# Patient Record
Sex: FEMALE | Race: OTHER | NOT HISPANIC OR LATINO | URBAN - METROPOLITAN AREA
[De-identification: names, ages, dates, MRNs, and addresses within clinical notes are randomized per-mention and may not be internally consistent; named-entity substitution may affect disease eponyms.]

---

## 2020-03-04 ENCOUNTER — EMERGENCY (EMERGENCY)
Facility: HOSPITAL | Age: 75
LOS: 1 days | Discharge: HOME | End: 2020-03-04
Admitting: EMERGENCY MEDICINE
Payer: MEDICARE

## 2020-03-04 VITALS
DIASTOLIC BLOOD PRESSURE: 100 MMHG | WEIGHT: 145.06 LBS | OXYGEN SATURATION: 96 % | HEIGHT: 66 IN | RESPIRATION RATE: 18 BRPM | HEART RATE: 53 BPM | SYSTOLIC BLOOD PRESSURE: 196 MMHG | TEMPERATURE: 98 F

## 2020-03-04 VITALS
OXYGEN SATURATION: 98 % | RESPIRATION RATE: 16 BRPM | HEART RATE: 60 BPM | SYSTOLIC BLOOD PRESSURE: 160 MMHG | DIASTOLIC BLOOD PRESSURE: 65 MMHG | TEMPERATURE: 98 F

## 2020-03-04 LAB
ALBUMIN SERPL ELPH-MCNC: 3.6 G/DL — SIGNIFICANT CHANGE UP (ref 3.4–5)
ALP SERPL-CCNC: 84 U/L — SIGNIFICANT CHANGE UP (ref 40–120)
ALT FLD-CCNC: 34 U/L — SIGNIFICANT CHANGE UP (ref 12–42)
ANION GAP SERPL CALC-SCNC: 7 MMOL/L — LOW (ref 9–16)
ANION GAP SERPL CALC-SCNC: 8 MMOL/L — LOW (ref 9–16)
AST SERPL-CCNC: 36 U/L — SIGNIFICANT CHANGE UP (ref 15–37)
BASOPHILS # BLD AUTO: 0.01 K/UL — SIGNIFICANT CHANGE UP (ref 0–0.2)
BASOPHILS NFR BLD AUTO: 0.2 % — SIGNIFICANT CHANGE UP (ref 0–2)
BILIRUB SERPL-MCNC: 0.6 MG/DL — SIGNIFICANT CHANGE UP (ref 0.2–1.2)
BUN SERPL-MCNC: 14 MG/DL — SIGNIFICANT CHANGE UP (ref 7–23)
BUN SERPL-MCNC: 18 MG/DL — SIGNIFICANT CHANGE UP (ref 7–23)
CALCIUM SERPL-MCNC: 9.6 MG/DL — SIGNIFICANT CHANGE UP (ref 8.5–10.5)
CALCIUM SERPL-MCNC: 9.6 MG/DL — SIGNIFICANT CHANGE UP (ref 8.5–10.5)
CHLORIDE SERPL-SCNC: 101 MMOL/L — SIGNIFICANT CHANGE UP (ref 96–108)
CHLORIDE SERPL-SCNC: 102 MMOL/L — SIGNIFICANT CHANGE UP (ref 96–108)
CO2 SERPL-SCNC: 30 MMOL/L — SIGNIFICANT CHANGE UP (ref 22–31)
CO2 SERPL-SCNC: 31 MMOL/L — SIGNIFICANT CHANGE UP (ref 22–31)
CREAT SERPL-MCNC: 0.67 MG/DL — SIGNIFICANT CHANGE UP (ref 0.5–1.3)
CREAT SERPL-MCNC: 0.67 MG/DL — SIGNIFICANT CHANGE UP (ref 0.5–1.3)
EOSINOPHIL # BLD AUTO: 0.02 K/UL — SIGNIFICANT CHANGE UP (ref 0–0.5)
EOSINOPHIL NFR BLD AUTO: 0.3 % — SIGNIFICANT CHANGE UP (ref 0–6)
GLUCOSE SERPL-MCNC: 112 MG/DL — HIGH (ref 70–99)
GLUCOSE SERPL-MCNC: 99 MG/DL — SIGNIFICANT CHANGE UP (ref 70–99)
HCT VFR BLD CALC: 41.6 % — SIGNIFICANT CHANGE UP (ref 34.5–45)
HGB BLD-MCNC: 13.8 G/DL — SIGNIFICANT CHANGE UP (ref 11.5–15.5)
IMM GRANULOCYTES NFR BLD AUTO: 0.2 % — SIGNIFICANT CHANGE UP (ref 0–1.5)
LYMPHOCYTES # BLD AUTO: 1.46 K/UL — SIGNIFICANT CHANGE UP (ref 1–3.3)
LYMPHOCYTES # BLD AUTO: 25.5 % — SIGNIFICANT CHANGE UP (ref 13–44)
MCHC RBC-ENTMCNC: 30.1 PG — SIGNIFICANT CHANGE UP (ref 27–34)
MCHC RBC-ENTMCNC: 33.2 GM/DL — SIGNIFICANT CHANGE UP (ref 32–36)
MCV RBC AUTO: 90.8 FL — SIGNIFICANT CHANGE UP (ref 80–100)
MONOCYTES # BLD AUTO: 0.52 K/UL — SIGNIFICANT CHANGE UP (ref 0–0.9)
MONOCYTES NFR BLD AUTO: 9.1 % — SIGNIFICANT CHANGE UP (ref 2–14)
NEUTROPHILS # BLD AUTO: 3.71 K/UL — SIGNIFICANT CHANGE UP (ref 1.8–7.4)
NEUTROPHILS NFR BLD AUTO: 64.7 % — SIGNIFICANT CHANGE UP (ref 43–77)
NRBC # BLD: 0 /100 WBCS — SIGNIFICANT CHANGE UP (ref 0–0)
PLATELET # BLD AUTO: 232 K/UL — SIGNIFICANT CHANGE UP (ref 150–400)
POTASSIUM SERPL-MCNC: 2.8 MMOL/L — CRITICAL LOW (ref 3.5–5.3)
POTASSIUM SERPL-MCNC: 4.2 MMOL/L — SIGNIFICANT CHANGE UP (ref 3.5–5.3)
POTASSIUM SERPL-SCNC: 2.8 MMOL/L — CRITICAL LOW (ref 3.5–5.3)
POTASSIUM SERPL-SCNC: 4.2 MMOL/L — SIGNIFICANT CHANGE UP (ref 3.5–5.3)
PROT SERPL-MCNC: 7.5 G/DL — SIGNIFICANT CHANGE UP (ref 6.4–8.2)
RBC # BLD: 4.58 M/UL — SIGNIFICANT CHANGE UP (ref 3.8–5.2)
RBC # FLD: 14.2 % — SIGNIFICANT CHANGE UP (ref 10.3–14.5)
SODIUM SERPL-SCNC: 139 MMOL/L — SIGNIFICANT CHANGE UP (ref 132–145)
SODIUM SERPL-SCNC: 140 MMOL/L — SIGNIFICANT CHANGE UP (ref 132–145)
TROPONIN I SERPL-MCNC: <0.017 NG/ML — LOW (ref 0.02–0.06)
WBC # BLD: 5.73 K/UL — SIGNIFICANT CHANGE UP (ref 3.8–10.5)
WBC # FLD AUTO: 5.73 K/UL — SIGNIFICANT CHANGE UP (ref 3.8–10.5)

## 2020-03-04 PROCEDURE — 99285 EMERGENCY DEPT VISIT HI MDM: CPT

## 2020-03-04 PROCEDURE — 70496 CT ANGIOGRAPHY HEAD: CPT | Mod: 26

## 2020-03-04 PROCEDURE — 70450 CT HEAD/BRAIN W/O DYE: CPT | Mod: 26,59

## 2020-03-04 PROCEDURE — 93010 ELECTROCARDIOGRAM REPORT: CPT

## 2020-03-04 PROCEDURE — 70498 CT ANGIOGRAPHY NECK: CPT | Mod: 26

## 2020-03-04 RX ORDER — POTASSIUM CHLORIDE 20 MEQ
10 PACKET (EA) ORAL ONCE
Refills: 0 | Status: COMPLETED | OUTPATIENT
Start: 2020-03-04 | End: 2020-03-04

## 2020-03-04 RX ORDER — MECLIZINE HCL 12.5 MG
25 TABLET ORAL ONCE
Refills: 0 | Status: COMPLETED | OUTPATIENT
Start: 2020-03-04 | End: 2020-03-04

## 2020-03-04 RX ORDER — POTASSIUM CHLORIDE 20 MEQ
40 PACKET (EA) ORAL ONCE
Refills: 0 | Status: COMPLETED | OUTPATIENT
Start: 2020-03-04 | End: 2020-03-04

## 2020-03-04 RX ORDER — ASPIRIN/CALCIUM CARB/MAGNESIUM 324 MG
81 TABLET ORAL ONCE
Refills: 0 | Status: COMPLETED | OUTPATIENT
Start: 2020-03-04 | End: 2020-03-04

## 2020-03-04 RX ORDER — ONDANSETRON 8 MG/1
4 TABLET, FILM COATED ORAL ONCE
Refills: 0 | Status: COMPLETED | OUTPATIENT
Start: 2020-03-04 | End: 2020-03-04

## 2020-03-04 RX ORDER — MECLIZINE HCL 12.5 MG
1 TABLET ORAL
Qty: 15 | Refills: 0
Start: 2020-03-04

## 2020-03-04 RX ADMIN — Medication 40 MILLIEQUIVALENT(S): at 18:34

## 2020-03-04 RX ADMIN — Medication 40 MILLIEQUIVALENT(S): at 13:02

## 2020-03-04 RX ADMIN — ONDANSETRON 4 MILLIGRAM(S): 8 TABLET, FILM COATED ORAL at 12:10

## 2020-03-04 RX ADMIN — Medication 81 MILLIGRAM(S): at 18:34

## 2020-03-04 RX ADMIN — Medication 25 MILLIGRAM(S): at 12:10

## 2020-03-04 RX ADMIN — Medication 100 MILLIEQUIVALENT(S): at 13:02

## 2020-03-04 NOTE — ED ADULT TRIAGE NOTE - CHIEF COMPLAINT QUOTE
BIBA c/o intermittent dizziness described as vertigo with nausea since Monday. as per pt, has been visiting her sister and heard someone have vertigo and does not know if she caught something. h/o HTN, on HCTZ and complaint. also takes atorvastatin and multivitamins. BIBA c/o intermittent dizziness described as vertigo with nausea since Monday. as per pt, has been visiting her sister and heard someone have vertigo and does not know if she caught something. h/o HTN, on HCTZ and compliant. also takes atorvastatin and multivitamins.

## 2020-03-04 NOTE — ED PROVIDER NOTE - EYES, MLM
Clear bilaterally, pupils equal, round and reactive to light. EOMI. Clear bilaterally, pupils equal, round and reactive to light. EOMI B/L. No vertical nystagmus.

## 2020-03-04 NOTE — ED ADULT NURSE NOTE - OBJECTIVE STATEMENT
73 yo F c/o intermittent dizziness since last Monday. Pt states her initial dizzy spell last week resolved, however today when she was standing and talking it began again. Pt describes it as "everything spinning and I feel hot and nauseous." Denies CP, SOB, fever/chills, V/D, headache, numbness/tingling. Pt speaking in full complete sentences, ambulatory with steady gait.

## 2020-03-04 NOTE — ED PROVIDER NOTE - NSFOLLOWUPINSTRUCTIONS_ED_ALL_ED_FT
PLEASE TAKE 1 TAB ASPIRIN 81MG BY MOUTH ONCE DAILY.    PLEASE TAKE 1 TAB MECLIZINE 25MG BY MOUTH THREE TIMES A DAY AS NEEDED FOR DIZZINESS (CAUTION: MAY CAUSE YOU TO FEEL TIRED / SEDATED)    PLEASE CALL DR. NUNEZ TOMORROW [phone] FOR FURTHER EVALUATION AND MANAGEMENT THIS WEEK AS DISCUSSED. HE HAS AN OFFICE ON [address] [place] [address] AND [facility].    RETURN TO THE ER IMMEDIATELY OR CALL 911 IF YOUR DIZZINESS RECURS/WORSENS, OR IF YOU DEVELOP A SEVERE HEADACHE, VISION CHANGES, CONFUSION, LOSS OF CONSCIOUSNESS, VOMITING, NECK PAIN, CHEST PAIN, DIFFICULTY BREATHING, NUMBNESS, WEAKNESS, OR FOR ANY OTHER CONCERNS.

## 2020-03-04 NOTE — ED PROVIDER NOTE - PATIENT PORTAL LINK FT
You can access the FollowMyHealth Patient Portal offered by [facility] by registering at the following website: [email or web address]. By joining [employer]’s FollowMyHealth portal, you will also be able to view your health information using other applications (apps) compatible with our system.

## 2020-03-04 NOTE — ED PROVIDER NOTE - NEUROLOGICAL, MLM
Alert and oriented, no focal deficits, no motor or sensory deficits. clear and coherent speech, normal cranial nerve exam, normal finger to nose and BLAKE.  No pronator drift.

## 2020-03-04 NOTE — ED PROVIDER NOTE - CLINICAL SUMMARY MEDICAL DECISION MAKING FREE TEXT BOX
EKG, Labs and CT reviewed. Potassium repleted with PO and IV potassium. Pt improved with Meclizine and Zofran in the ED and now reports feeling much better. Walking around ED without difficulty. No neuro deficits on exam. Neurosurgery was consulted. Case and CT findings were discussed with Neurosurgery RUFINO Gilbert who reviewed with Neurosurgery Attending, and requests that patient be started on ASA 81mg daily and to call Dr. Cruz tomorrow to follow up for further evaluation and management. First dose of ASA 81mg administered here, pt agrees to continue ASA 81mg daily and to F/U as instructed. Strict return precautions reviewed with pt in which pt verbalizes understanding and agrees to.

## 2020-03-04 NOTE — ED PROVIDER NOTE - ENMT, MLM
Airway patent, Nasal mucosa clear. Mouth with normal mucosa. Throat has no vesicles, no oropharyngeal exudates and uvula is midline. TMs WNL bilaterally.

## 2020-03-04 NOTE — ED PROVIDER NOTE - CARE PROVIDER_API CALL
Karime Cruz (MD; PhD)  Diagnostic Radiology; Neuroradiology  [address], [address]  [place] [address]  Phone: [phone]  Fax: [phone]  Follow Up Time:

## 2020-03-04 NOTE — ED ADULT NURSE NOTE - CHIEF COMPLAINT QUOTE
BIBA c/o intermittent dizziness described as vertigo with nausea since Monday. as per pt, has been visiting her sister and heard someone have vertigo and does not know if she caught something. h/o HTN, on HCTZ and complaint. also takes atorvastatin and multivitamins.

## 2020-03-04 NOTE — ED PROVIDER NOTE - OBJECTIVE STATEMENT
75 y/o female with PMHx of HTN (on HCTZ) and HLD (on Atorvastatin) presents to ED c/o dizziness. Patient reports onset of dizziness (room spinning sensation) and associated nausea, episode of vomiting, and dull HA 2 days ago on Monday morning. States symptoms were intermittent but improved the next day. However, she notes dizziness recurred this morning with chills. Patient describes dizziness today as "sloshy" sensation that is worse with movement of the head. Denies any nausea or vomiting today. Also denies any fever, CP, SOB, palpitations, abd pain, vision changes, neck pain, or syncope. States she was told by her  that her BP was high today, and admits to a missed dose of HCTZ 2 days ago (although took her most recent dose yesterday). 73 y/o female with PMHx of HTN (on HCTZ) and HLD (on Atorvastatin) presents to ED c/o dizziness. Patient reports onset of dizziness (room spinning sensation) and associated nausea, episode of vomiting, and dull HA 2 days ago on Monday morning. States symptoms were intermittent but improved the next day. However, she notes dizziness recurred this morning with chills. Patient describes dizziness today as "sloshy" sensation that is worse with movement of the head. Denies any nausea or vomiting today. Also denies any fever, CP, SOB, palpitations, abd pain, vision changes, neck pain, or syncope. States she was told by her  that her BP was high today, and admits to a missed dose of HCTZ 2 days ago (although took her most recent dose yesterday). Patient's BP in ED triage was 196/100.

## 2020-03-09 DIAGNOSIS — R42 DIZZINESS AND GIDDINESS: ICD-10-CM

## 2020-03-09 DIAGNOSIS — R68.83 CHILLS (WITHOUT FEVER): ICD-10-CM

## 2020-03-09 DIAGNOSIS — R51 HEADACHE: ICD-10-CM

## 2020-03-09 DIAGNOSIS — R11.2 NAUSEA WITH VOMITING, UNSPECIFIED: ICD-10-CM

## 2020-10-06 NOTE — ED ADULT NURSE NOTE - CADM POA PRESS ULCER
Called patient's insurance spoke with Jenelle Anderson , no prior Candelaria Plascencia is needed for cpt code 37756. It does have a frequency of 1 and 1 day allowed.   Ref# [id number] No

## 2020-12-29 NOTE — ED ADULT NURSE NOTE - CHPI ED NUR SYMPTOMS NEG
Patient unable to complete
no decreased eating/drinking/no weakness/no pain/no tingling/no vomiting/no fever/no chills

## 2023-10-05 ENCOUNTER — EMERGENCY (EMERGENCY)
Facility: HOSPITAL | Age: 78
LOS: 1 days | Discharge: HOME | End: 2023-10-05
Attending: EMERGENCY MEDICINE | Admitting: EMERGENCY MEDICINE
Payer: MEDICARE

## 2023-10-05 VITALS
DIASTOLIC BLOOD PRESSURE: 87 MMHG | HEART RATE: 76 BPM | TEMPERATURE: 99 F | SYSTOLIC BLOOD PRESSURE: 131 MMHG | OXYGEN SATURATION: 95 % | RESPIRATION RATE: 18 BRPM | WEIGHT: 154.98 LBS

## 2023-10-05 VITALS
OXYGEN SATURATION: 96 % | DIASTOLIC BLOOD PRESSURE: 84 MMHG | RESPIRATION RATE: 18 BRPM | SYSTOLIC BLOOD PRESSURE: 143 MMHG | TEMPERATURE: 98 F | HEART RATE: 55 BPM

## 2023-10-05 DIAGNOSIS — M25.511 PAIN IN RIGHT SHOULDER: ICD-10-CM

## 2023-10-05 DIAGNOSIS — Z91.048 OTHER NONMEDICINAL SUBSTANCE ALLERGY STATUS: ICD-10-CM
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
## 2023-10-05 DIAGNOSIS — Z91.013 ALLERGY TO SEAFOOD: ICD-10-CM
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
## 2023-10-05 PROBLEM — I10 ESSENTIAL (PRIMARY) HYPERTENSION: Chronic | Status: ACTIVE | Noted: 2020-03-04

## 2023-10-05 PROBLEM — E78.5 HYPERLIPIDEMIA, UNSPECIFIED: Chronic | Status: ACTIVE | Noted: 2020-03-04

## 2023-10-05 PROCEDURE — 73030 X-RAY EXAM OF SHOULDER: CPT | Mod: 26,RT

## 2023-10-05 PROCEDURE — 99284 EMERGENCY DEPT VISIT MOD MDM: CPT

## 2023-10-05 PROCEDURE — 71046 X-RAY EXAM CHEST 2 VIEWS: CPT | Mod: 26

## 2023-10-05 RX ORDER — IBUPROFEN 200 MG
1 TABLET ORAL
Qty: 30 | Refills: 0
Start: 2023-10-05 | End: 2023-10-14

## 2023-10-05 NOTE — ED PROVIDER NOTE - MUSCULOSKELETAL, MLM
No cervical, thoracic ot lumbar spine tenderness to percussion or palpation. Flexion/extension of spine without pain. + tenderness to right  trapezius FROM of rigth shoulder no edema erythema or right arm

## 2023-10-05 NOTE — ED PROVIDER NOTE - WR INTERPRETATION 1
CXR negative - No pneumothorax, No opacities, No free airCXR negative - No infiltrates, No consolidation, No atelectasis seenMSK XR negative - No fracture, No dislocation, No foreign body

## 2023-10-05 NOTE — ED PROVIDER NOTE - NSFOLLOWUPINSTRUCTIONS_ED_ALL_ED_FT
1. stay well hydrated  2. take all medications as directed without fail  3. follow up with your primary care doctor in 1 -2 days  4. return to ER if your symptoms worsen or for any other concern

## 2023-10-05 NOTE — ED PROVIDER NOTE - PATIENT PORTAL LINK FT
You can access the FollowMyHealth Patient Portal offered by [facility] by registering at the following website: [email or web address]. By joining [employer]’s FollowMyHealth portal, you will also be able to view your health information using other applications (apps) compatible with our system.

## 2023-10-05 NOTE — ED PROVIDER NOTE - CONDITION AT DISCHARGE:

## 2023-10-05 NOTE — ED PROVIDER NOTE - CLINICAL SUMMARY MEDICAL DECISION MAKING FREE TEXT BOX
A medical screening examination was performed and no emergency medical condition was identified.    At the time of discharge from the Emergency Department, the patient is alert with fluent appropriate speech and ambulatory without difficulty. The patient demonstrates capacity at time of discharge and verbally consents to discharge.     ED evaluation and management discussed with the patient and family (if available) in detail.  Close PMD and/or specialist follow up explained and encouraged.  Strict ED return instructions discussed in detail for any worsening or new symptoms. The patient and family (if present) was given the opportunity to ask questions about their ER evaluation, discharge diagnosis and discharge instructions. The patient verbalized understanding of this information and instructions and importantly the need to return to the ED for  worsening of illness or for any concern.    The patient received a printed version of the discharge instructions. The patient verbally expressed understanding that the Emergency Department diagnosis is a preliminary diagnosis often based on limited information and that the patient must adhere to the follow-up plan as discussed.

## 2023-10-05 NOTE — ED PROVIDER NOTE - OBJECTIVE STATEMENT
This is a pleasant 78-year-old woman who presents to the emergency room with right upper shoulder pain for 2 weeks nonprogressive but persistent patient describes the pain as aching worse to the touch and worse with movement of her right shoulder no neck pain no radiation of the pain down her left arm no chest pain no back pain no flank pain no shortness of breath no cough no fever patient states that the symptoms occurred after caring a heavy bag around her right shoulder area the straps of which went over her shoulder and since then the symptoms have not abated patient denies any motor or sensory visual speech or balance problems

## 2023-10-05 NOTE — ED ADULT TRIAGE NOTE - CHIEF COMPLAINT QUOTE
c/ right shoulder pain radiating to neck s/p lifting a laundry basket since 8/28/23- Pt denies cp, sob

## 2025-03-20 NOTE — ED PROVIDER NOTE - CPE EDP MUSC NORM
normal... Female Completion Statement: After discussing her treatment course we decided to discontinue isotretinoin therapy at this time. I explained that she would need to continue her birth control methods for at least one month after the last dosage. She should also get a pregnancy test one month after the last dose. She shouldn't donate blood for one month after the last dose. She should call with any new symptoms of depression.

## 2025-07-15 ENCOUNTER — EMERGENCY (EMERGENCY)
Facility: HOSPITAL | Age: 80
LOS: 1 days | End: 2025-07-15
Admitting: EMERGENCY MEDICINE
Payer: MEDICARE

## 2025-07-15 VITALS
SYSTOLIC BLOOD PRESSURE: 142 MMHG | DIASTOLIC BLOOD PRESSURE: 89 MMHG | WEIGHT: 149.91 LBS | HEART RATE: 94 BPM | OXYGEN SATURATION: 96 % | TEMPERATURE: 98 F | RESPIRATION RATE: 17 BRPM

## 2025-07-15 DIAGNOSIS — M79.605 PAIN IN LEFT LEG: ICD-10-CM

## 2025-07-15 DIAGNOSIS — M25.552 PAIN IN LEFT HIP: ICD-10-CM

## 2025-07-15 DIAGNOSIS — Z91.048 OTHER NONMEDICINAL SUBSTANCE ALLERGY STATUS: ICD-10-CM

## 2025-07-15 DIAGNOSIS — G89.29 OTHER CHRONIC PAIN: ICD-10-CM

## 2025-07-15 PROCEDURE — 93971 EXTREMITY STUDY: CPT | Mod: 26,LT

## 2025-07-15 PROCEDURE — 73502 X-RAY EXAM HIP UNI 2-3 VIEWS: CPT | Mod: 26,LT

## 2025-07-15 PROCEDURE — 73562 X-RAY EXAM OF KNEE 3: CPT | Mod: 26,LT

## 2025-07-15 PROCEDURE — 99285 EMERGENCY DEPT VISIT HI MDM: CPT

## 2025-07-15 NOTE — ED ADULT TRIAGE NOTE - CHIEF COMPLAINT QUOTE
pt c/o left leg pain x over 1 year, pt stated " I have seen multiple doctors and physical therapy but its not getting better"

## 2025-07-15 NOTE — ED PROVIDER NOTE - OBJECTIVE STATEMENT
78 y/o female here with left sided leg pain for the past year. Patient endorses left hip pain as well. Denies fever, chills, hematuria, vaginal bleeding, d/c, abdominal pain, change in bowel function, flank pain, rash, HA, dizziness, SOB, CP, palpitations, diaphoresis, cough, and malaise. Patient appears well and vibrant

## 2025-07-15 NOTE — ED ADULT NURSE NOTE - OBJECTIVE STATEMENT
79 year old female walked in complaining of left lower leg pain. She reports she has had pain in her left knee for a few years and been diagnosed with arthritis. She has seen her PCP, an orthopedist, and PT without improvement. Reports it's been greater than 2 years since she has had imaging done. Also endorses hx of varicose veins. Denies hx of clots. She is able to ambulate and usually controls her pain with ibuprofen. None taken today.

## 2025-07-15 NOTE — ED PROVIDER NOTE - NSFOLLOWUPINSTRUCTIONS_ED_ALL_ED_FT
Follow up with your primary care doctor or clinics listed below if you do not have a doctor  Return immediately for any new or worsening symptoms or any new concerns
No

## 2025-07-15 NOTE — ED PROVIDER NOTE - PATIENT PORTAL LINK FT
You can access the FollowMyHealth Patient Portal offered by [facility] by registering at the following website: [email or web address]. By joining [employer]’s FollowMyHealth portal, you will also be able to view your health information using other applications (apps) compatible with our system.

## 2025-07-15 NOTE — ED PROVIDER NOTE - CLINICAL SUMMARY MEDICAL DECISION MAKING FREE TEXT BOX
Patient here with chronic left leg and hip pain. Patient exam unremarkable   Plan: US and XR    Imaging negative    will d/c with return precautions